# Patient Record
Sex: MALE | Race: WHITE
[De-identification: names, ages, dates, MRNs, and addresses within clinical notes are randomized per-mention and may not be internally consistent; named-entity substitution may affect disease eponyms.]

---

## 2017-08-06 ENCOUNTER — HOSPITAL ENCOUNTER (EMERGENCY)
Dept: HOSPITAL 21 - SED | Age: 37
Discharge: HOME | End: 2017-08-06
Payer: COMMERCIAL

## 2017-08-06 ENCOUNTER — HOSPITAL ENCOUNTER (EMERGENCY)
Dept: HOSPITAL 21 - SED | Age: 37
Discharge: HOME | End: 2017-08-06
Payer: MEDICARE

## 2017-08-06 VITALS — HEIGHT: 71 IN | WEIGHT: 208.45 LBS | BODY MASS INDEX: 29.18 KG/M2

## 2017-08-06 VITALS
DIASTOLIC BLOOD PRESSURE: 88 MMHG | SYSTOLIC BLOOD PRESSURE: 151 MMHG | OXYGEN SATURATION: 98 % | RESPIRATION RATE: 22 BRPM | HEART RATE: 88 BPM

## 2017-08-06 VITALS
HEART RATE: 71 BPM | SYSTOLIC BLOOD PRESSURE: 134 MMHG | OXYGEN SATURATION: 96 % | DIASTOLIC BLOOD PRESSURE: 90 MMHG | RESPIRATION RATE: 16 BRPM

## 2017-08-06 VITALS
HEART RATE: 70 BPM | RESPIRATION RATE: 16 BRPM | SYSTOLIC BLOOD PRESSURE: 128 MMHG | OXYGEN SATURATION: 98 % | DIASTOLIC BLOOD PRESSURE: 82 MMHG

## 2017-08-06 DIAGNOSIS — F41.9: Primary | ICD-10-CM

## 2017-08-06 DIAGNOSIS — R11.2: ICD-10-CM

## 2017-08-06 DIAGNOSIS — F10.21: ICD-10-CM

## 2017-08-06 DIAGNOSIS — F14.21: ICD-10-CM

## 2017-08-06 DIAGNOSIS — F20.9: ICD-10-CM

## 2017-08-06 PROCEDURE — 99283 EMERGENCY DEPT VISIT LOW MDM: CPT

## 2017-08-06 NOTE — ED.REPORT
HPI-NVD


Date of Service


Aug 6, 2017


 ED Provider:  Dr. Be Francis MD


A 37 year old male with a previous history of cocaine and EtOH abuse (in 

recovery 5 years) presents to the ED via EMS with nausea and vomiting that 

began this afternoon. Patient was recently released from prison and has been 

living at [facility]. He reports that he has been feeling very anxious lately 

which has exacerbated his nausea. Patient has not been taking his regular 

medications for the past few days which include quetiapine and BuSpar. BM have 

been normal since symptom onset. He denies any recent fever, chills, abdominal 

pain, or chest pain. Patient adds that he has been drinking 2-3 Red Bull energy 

drinks per day for the past few weeks.


Nursing Notes


Stated Complaint:  NAUSEA,VOMITING


Chief Complaint:  General Complaint


Nursing Notes Reviewed:  Yes


Allergies:  


Coded Allergies:  


     No Known Allergies (Unverified  Allergy, Unknown, 8/6/17)


Scheduled


Ondansetron ODT (Ondansetron ODT) 8 Mg Tab.rapdis 8 MG PO QID 


risperiDONE-Expunged Drug, Do Not Renew! (RisperDAL-Expunged Drug, Do Not Renew!

) 2 Mg Tablet 2 MG PO HS 





General


Time Seen by MD:  01:23





Chief Complaint


Nausea, Vomiting


Hx Obtained From:  Patient


Arrived By:  Ambulance


Onset Occurred:  5 - 8 hours ago


Symptom Duration:  Since onset


Location:  : No pain


Associated with:  Denies: Abdominal pain, Fever, Shaking chills


Pertinent Negative:  Pt denies other symptoms


Recent Healthcare:  No recent doctor visit, No recent hospitalization





Past Medical History


Past Medical History





Cocaine abuse - in recovery


EtOH abuse - in recovery





Past Surgical History





None reported.





Smoking History


Unknown if Ever Smoker





Social History


Alcohol Use:  In recovery


Drug Use:  In recovery, Cocaine


Other Social History:  Good social support, Local resident





Ambulatory Status


Independent





Review of Systems


Constitutional:  Denies: Chills, Fever


GI:  Reports: Nausea, Vomiting, 


   Denies: Abdominal pain, Constipation, Diarrhea


Complete sys rev & neg:  except as marked.


Cardiovascular:  Denies: Chest pain


Psychiatric:  Reports: Anxiety





Physical Exam


Initial Vital Signs





 Vital Signs (First)








  Date Time  Temp Pulse Resp B/P Pulse Ox O2 Delivery O2 Flow Rate FiO2


 


8/6/17 00:57 36.0 71 16 134/90 96 Room Air  








Initial VS:  Reviewed, Vital signs abnormal


    Head / Eyes:  Atraumatic, Normocephalic, PERRL


    Neck:  Supple, Non-tender, Full range of motion


    Extremities:  Vascular intact, Neuro intact, No swelling, No tenderness


    Skin:  Warm, Dry, No cyanosis


    Neurologic:  Alert, Oriented, Nonfocal


General/Constitutional:  Awake, Alert, No acute distress


Abdomen:  Atraumatic, Soft, Non-tender


Respiratory / Chest:  Atraumatic, Breath sounds NL, Breath sounds = bilat, No 

respiratory distress


Cardiovascular:  Heart rate NL, Regular rhythm, Heart sounds NL


Abnormal Mood/Affect:  Positive: Flat affect





Re-Eval/Medical Decision


Med Decision/Clinical Course


37-year-old male who seems to be doing well at [facility] following 5-1/2 

years in prison.  He is on multiple psychiatric medications.  Today he had some 

nausea and vomiting and then could not take his meds and had anxiety associated 

with that.  He was given ondansetron and he tolerated by mouth here.  He was 

given his evening doses of medications and had no further vomiting.  He will be 

discharged home to resume his usual routine.  He will talk to his psych 

provider if he has further problems.


Re-Evaluation/Progress #1:  


   Time of Eval:  02:13


   Re-Evaluation/Progress Note:  


Assigned PO challenge. Nausea has improved but is still present.


Re-Evaluation/Progress #2:  


   Time of Eval:  03:02


   Patient Status:  Condition improved


   Re-Evaluation/Progress Note:  


Patient's symptoms have improved. He is requesting discharge at this time.


All questions are addressed. He is informed of his results and diagnosis.


Counseled Regarding:  Diagnosis, Need for follow-up, When/why to return to ED





Discharge & Departure


Impression:  


 Primary Impression:  


 Anxiety


 Additional Impression:  


 Nausea & vomiting


 Vomiting type:  unspecified  Vomiting Intractability:  non-intractable  

Qualified Code:  R11.2 - Nausea with vomiting, unspecified


Disposition:  Home


Discharge Condition


All VS Reviewed:  Yes


Condition:  Improved


Patient Instructions:  Acute Nausea and Vomiting (ED)





Additional Instructions:


Continue your regular medications.  Talk to your provider about your symptoms 

if she have continued problems.  Ondansetron 8 mg ODT, 1 tablet dissolved 

orally 3-4 times daily as needed for nausea and vomiting, #10 prescription 

written.


Referrals:  


Nitza Reich MD (PCP)


Scribe Attestation


Portions of this note were transcribed by Rosendo Key. IDr. Francis 

personally performed the history, physical exam and medical decision-making; I 

reviewed and confirmed the accuracy of the information in the transcribed note.


copies to:   Nitza Reich MD, Be VILLEDA MD Aug 6, 2017 01:24


ROSENDO KEY Aug 6, 2017 01:28

## 2017-08-06 NOTE — ED.REPORT
HPI-General Illness


Date of Service


Aug 6, 2017


 ED Provider:  Dr. Neumann


Pt is a 37 year old male with a history of schizophrenia who presents to the ED 

with concerns for anxiety. He reports that when he trying to go to sleep, the 

anxiety is the most severe. Pt states that he has been taking all of his 

regular medications, and he was just discharged several hours ago .Pt is 

requesting additional Ativan. He denies any suicidal or homicidal ideations, he 

denies visual hallucinations but admits to auditory hallucinations, at his 

baseline.


Nursing Notes


Stated Complaint:  ANXIETY


Chief Complaint:  Psychiatric Complaint


Nursing Notes Reviewed:  Yes


Allergies:  


Coded Allergies:  


     No Known Allergies (Unverified  Allergy, Unknown, 8/6/17)


Scheduled


Ondansetron ODT (Ondansetron ODT) 8 Mg Tab.rapdis 8 MG PO QID 


risperiDONE-Expunged Drug, Do Not Renew! (RisperDAL-Expunged Drug, Do Not Renew!

) 2 Mg Tablet 2 MG PO HS 





Scheduled PRN


hydrOXYzine Hcl (HydrOXYzine Hcl) 25 Mg Tablet 25 MG PO TID PRN PRN For Anxiety 





General


Time Seen by MD:  07:32





Chief Complaint


Other (Anxiety)


Hx Obtained From:  Patient


Arrived By:  Walk-in


Sudden in Onset?:  No


Severity: Current:  No pain currently


Severity: Maximum:  No pain


Similar Sx Previous:  Yes





Past Medical History


Past Medical History





Cocaine abuse - in recovery


EtOH abuse - in recovery





Past Surgical History





None reported.





Smoking History


Unknown if Ever Smoker





Social History


Alcohol Use:  In recovery


Drug Use:  In recovery, Cocaine


Other Social History:  Good social support, Local resident





Ambulatory Status


Independent





Review of Systems


Full Review of Systems


Constitutional:  Denies: Chills, Fever, Malaise, Weakness - generalized


Respiratory:  Denies: Non-productive cough, Shortness of breath, Wheezing


Cardiovascular:  Denies: Chest pain, Syncope


GI:  Denies: Abdominal pain, Diarrhea, Nausea, Vomiting


 Male:  Denies Dysuria, Denies Flank pain, Denies Urinary frequency, Denies 

Urinary urgency


Musculoskeletal:  Denies: Back pain


Psychiatric:  Reports: Anxiety, Hallucinations, auditory, 


   Denies: Hallucinations, visual


Complete sys rev & neg:  except as marked.





Physical Exam


Vital Signs





 Vital Signs








  Date Time  Temp Pulse Resp B/P Pulse Ox O2 Delivery O2 Flow Rate FiO2


 


8/6/17 07:28 36.5 88 22 151/88 98 Room Air  








Initial VS:  Reviewed


General/Constitutional:  Well-developed, Well-nourished


    Head / Eyes:  Atraumatic, Normocephalic, PERRL


    ENT:  Mucous membranes moist, Conjunctiva normal, No scleral icterus


    Neck:  Supple, Non-tender, Full range of motion


    Respiratory:  Breath sounds normal, Clear to auscultation, No respiratory 

distress


    Skin:  Warm, Dry, No cyanosis


Psychiatric:  Affect NL, Mood NL, Not suicidal, Not homicidal, Cognitive 

function NL, Judgment/insight NL, Thought content NL


Abnormal Thinking / Perception:  Positive: Hallucinations, auditory





Re-Eval/Medical Decision


Med Decision/Clinical Course





Not suicidal or homicidal, no command hallucinations, will give a


three-day supply of hydroxyzine and recommend close follow-up with


psychiatry.  Return precautions given


Source of Hx:  Old records





   Time of Eval:  08:38


   Re-Evaluation/Progress Note:  


Pt is rechecked and informed of his diagnosis and informed of the plan to


discharge him at this time. He understands and agrees, all questions are


addressed.


Counseled Regarding:  Diagnosis, Need for follow-up, When/why to return to ED





Discharge & Departure





 Primary Impression:  


 Anxiety


Disposition:  Home


Discharge Condition


All VS Reviewed:  Yes


Condition:  Stable





Additional Instructions:


Call your psychiatrist in the morning for close follow-up and reevaluation of 

your medications.





Use hydroxyzine up to 3 times a day as needed for anxiety.





Return to the ER if you develop feelings of suicide, homicide, major depression

, or any other urgent concerns.


Referrals:  


Nitza Reich MD (PCP)


Scribe Attestation


Portions of this note were transcribed by Berkley Villanueva. I, Dr. Neumann 

personally performed the history, physical exam and medical decision-making; I 

reviewed and confirmed the accuracy of the information in the transcribed note. 

Signed by: Hal Yusuf, 08/06/2017 08:09


copies to:   Nitza Reich MD, Timothy LARRY MEREDITH Aug 6, 2017 07:33


NEHEMIAS VILLANUEVA Aug 6, 2017 07:54

## 2019-07-13 ENCOUNTER — HOSPITAL ENCOUNTER (EMERGENCY)
Dept: HOSPITAL 76 - ED | Age: 39
Discharge: HOME | End: 2019-07-13
Payer: MEDICARE

## 2019-07-13 ENCOUNTER — HOSPITAL ENCOUNTER (OUTPATIENT)
Dept: HOSPITAL 76 - EMS | Age: 39
Discharge: ANOTHER HOSPITAL | End: 2019-07-13
Attending: SURGERY
Payer: MEDICARE

## 2019-07-13 VITALS — DIASTOLIC BLOOD PRESSURE: 68 MMHG | SYSTOLIC BLOOD PRESSURE: 119 MMHG

## 2019-07-13 DIAGNOSIS — R41.82: Primary | ICD-10-CM

## 2019-07-13 DIAGNOSIS — F17.200: ICD-10-CM

## 2019-07-13 DIAGNOSIS — R45.1: ICD-10-CM

## 2019-07-13 DIAGNOSIS — F25.0: Primary | ICD-10-CM

## 2019-07-13 LAB
AMPHET UR QL SCN: NEGATIVE
ANION GAP SERPL CALCULATED.4IONS-SCNC: 13 MMOL/L (ref 6–13)
APAP SERPL-MCNC: < 10 UG/ML (ref 10–30)
BASOPHILS NFR BLD AUTO: 0.1 10^3/UL (ref 0–0.1)
BASOPHILS NFR BLD AUTO: 1 %
BENZODIAZ UR QL SCN: NEGATIVE
BUN SERPL-MCNC: 23 MG/DL (ref 6–20)
CALCIUM UR-MCNC: 9.4 MG/DL (ref 8.5–10.3)
CHLORIDE SERPL-SCNC: 107 MMOL/L (ref 101–111)
CLARITY UR REFRACT.AUTO: CLEAR
CO2 SERPL-SCNC: 22 MMOL/L (ref 21–32)
COCAINE UR-SCNC: NEGATIVE UMOL/L
CREAT SERPLBLD-SCNC: 0.9 MG/DL (ref 0.6–1.2)
EOSINOPHIL # BLD AUTO: 0.1 10^3/UL (ref 0–0.7)
EOSINOPHIL NFR BLD AUTO: 1.4 %
ERYTHROCYTE [DISTWIDTH] IN BLOOD BY AUTOMATED COUNT: 12.6 % (ref 12–15)
GFRSERPLBLD MDRD-ARVRAT: 94 ML/MIN/{1.73_M2} (ref 89–?)
GLUCOSE SERPL-MCNC: 89 MG/DL (ref 70–100)
GLUCOSE UR QL STRIP.AUTO: NEGATIVE MG/DL
HGB UR QL STRIP: 13.9 G/DL (ref 14–18)
KETONES UR QL STRIP.AUTO: NEGATIVE MG/DL
LYMPHOCYTES # SPEC AUTO: 1.6 10^3/UL (ref 1.5–3.5)
LYMPHOCYTES NFR BLD AUTO: 22 %
MCH RBC QN AUTO: 30.8 PG (ref 27–31)
MCHC RBC AUTO-ENTMCNC: 33.2 G/DL (ref 32–36)
MCV RBC AUTO: 92.7 FL (ref 80–94)
METHADONE UR QL SCN: NEGATIVE
METHAMPHET UR QL SCN: NEGATIVE
MONOCYTES # BLD AUTO: 0.6 10^3/UL (ref 0–1)
MONOCYTES NFR BLD AUTO: 8.8 %
NEUTROPHILS # BLD AUTO: 4.8 10^3/UL (ref 1.5–6.6)
NEUTROPHILS # SNV AUTO: 7.2 X10^3/UL (ref 4.8–10.8)
NEUTROPHILS NFR BLD AUTO: 66.5 %
NITRITE UR QL STRIP.AUTO: NEGATIVE
OPIATES UR QL SCN: NEGATIVE
PDW BLD AUTO: 10.6 FL (ref 7.4–11.4)
PH UR STRIP.AUTO: 6.5 PH (ref 5–7.5)
PLATELET # BLD: 182 10^3/UL (ref 130–450)
PROT UR STRIP.AUTO-MCNC: NEGATIVE MG/DL
RBC # UR STRIP.AUTO: (no result) /UL
RBC MAR: 4.52 10^6/UL (ref 4.7–6.1)
SALICYLATES SERPL-MCNC: < 6 MG/DL
SODIUM SERPLBLD-SCNC: 142 MMOL/L (ref 135–145)
SP GR UR STRIP.AUTO: 1.01 (ref 1–1.03)
UROBILINOGEN UR QL STRIP.AUTO: 2 E.U./DL
UROBILINOGEN UR STRIP.AUTO-MCNC: NEGATIVE MG/DL
VOLATILE DRUGS POS SERPL SCN: (no result)

## 2019-07-13 PROCEDURE — 87086 URINE CULTURE/COLONY COUNT: CPT

## 2019-07-13 PROCEDURE — 80048 BASIC METABOLIC PNL TOTAL CA: CPT

## 2019-07-13 PROCEDURE — 99283 EMERGENCY DEPT VISIT LOW MDM: CPT

## 2019-07-13 PROCEDURE — 36415 COLL VENOUS BLD VENIPUNCTURE: CPT

## 2019-07-13 PROCEDURE — 80307 DRUG TEST PRSMV CHEM ANLYZR: CPT

## 2019-07-13 PROCEDURE — 80320 DRUG SCREEN QUANTALCOHOLS: CPT

## 2019-07-13 PROCEDURE — 84443 ASSAY THYROID STIM HORMONE: CPT

## 2019-07-13 PROCEDURE — 81001 URINALYSIS AUTO W/SCOPE: CPT

## 2019-07-13 PROCEDURE — 85025 COMPLETE CBC W/AUTO DIFF WBC: CPT

## 2019-07-13 PROCEDURE — 80329 ANALGESICS NON-OPIOID 1 OR 2: CPT

## 2019-07-13 PROCEDURE — 96372 THER/PROPH/DIAG INJ SC/IM: CPT

## 2019-07-13 PROCEDURE — 80306 DRUG TEST PRSMV INSTRMNT: CPT

## 2019-07-13 PROCEDURE — 81003 URINALYSIS AUTO W/O SCOPE: CPT

## 2019-07-13 NOTE — ED PHYSICIAN DOCUMENTATION
<Bobbi Grier Reyna - Last Filed: 07/13/19 07:23>





PD HPI MHE





- Stated complaint


Stated Complaint: MHE





- Chief complaint


Chief Complaint: MHE





- History obtained from


History obtained from: Patient





- History of Present Illness


Primary symptom: Psychosis


Timing - onset: How many weeks ago (1)


Similar symptoms before: Diagnosis (Reportedly has a history of schizophrenia 

per EMS)





- Additional information


Additional information: 





This is a 39-year-old man who presents by EMS worried about "an attack" that is 

been perpetuated on him.  He thinks his food might of been poisoned and it is a 

satanic attack by Lucifer.  Manifesting itself is abdominal pain and he is been 

having symptoms for about a week but they are getting progressively worse and 

"something perverted" entered his mouth.  He called 911 earlier in the evening 

but he refused transport because they told him that they were to take him for 

mental health but he is insistent that his symptoms are not related to any 

mental health issue.  He went to see a psychic who told him that a creature had 

entered into his stomach but it was not [place].  He had been incarcerated for 66

months and was released about 2 years ago but he complied with the "DOC" in 

terms of their treatment plan taking medications so that he could be eligible 

for housing.  He says he left the housing about a month ago and has been living 

in hotels ever since then and his SSI disability is due to run out.  He says he 

is quit drinking.  He denies any abdominal surgeries.  He is never had anything 

like this happen before.  Patient is not forthcoming to answer any specific 

questions





Review of Systems


Unable to obtain: Other (Patient is psychotic and clearly hallucinating)


GI: reports: Abdominal Pain.  denies: Vomiting





PD PAST MEDICAL HISTORY





- Past Medical History


Other Past Medical History: Unknown medical HX- unable





- Present Medications


Home Medications: 


                                Ambulatory Orders











 Medication  Instructions  Recorded  Confirmed


 


No Known Home Medications  07/13/19 07/13/19














- Allergies


Allergies/Adverse Reactions: 


                                    Allergies











Allergy/AdvReac Type Severity Reaction Status Date / Time


 


No Known Drug Allergies Allergy   Verified 07/13/19 02:58














- Social History


Does the pt smoke?: Yes


Smoking Status: Current every day smoker


Does the pt drink ETOH?: Yes


ETOH Use: Beer





- POLST


Patient has POLST: No





PD ED PE NORMAL





- Vitals


Vital signs reviewed: Yes





- General


General: Other (He is a thin 39-year-old man who speaks rapidly and is clearly 

hallucinating.  He occasionally looks over each of his shoulders and mumbles 

something to some nonvisible entity.  He insists that he is not hearing voices 

but that they are "spirits".)





- HEENT


HEENT: Atraumatic





- Neck


Neck: Supple, no meningeal sign, No adenopathy, Thyroid normal





- Cardiac


Cardiac: RRR, No murmur





- Respiratory


Respiratory: No respiratory distress, Clear bilaterally





- Abdomen


Abdomen: Normal bowel sounds, Soft, Non tender





- Derm


Derm: Normal color, No rash, Other (No injury noted to the upper extremities)





- Extremities


Extremities: No edema





- Neuro


Neuro: Other (There are no gross neurological deficits.)





- Psych


Psych: Other (He is agitated, frequently shouting out but not aggressive or 

confrontational.  Clearly hallucinating.  He believes that people do not like 

him because he is in the Bible which he is quick to point out to me is the best 

selling book in history.)





PD MEDICAL DECISION MAKING





- ED course


Complexity details: re-evaluated patient, d/w patient


ED course: 





Patient did agree to have his blood drawn and mental health screening labs were 

sent as well as a urine specimen for urine drug screen.  These labs are all 

normal and the drug screen is negative negative alcohol level.  The patient 

continued to be agitated repeatedly saying that he did not want to die in this 

hospital and he needed to go to [place] where they could help him.  He 

believes that he is going to die here and that if we give him any medication is 

going to kill him.  He repeatedly tells me that he came here voluntarily and 

therefore cannot be an involuntary committal.  He declined Zyprexa orally to 

help with his agitation and the longer he was here he just was progressively 

more disruptive but never confrontational.  [facility] due to his paranoid delusions and psychosis.  I 

do not think he is capable of caring for himself in the community at this point 

and has no one that he can call or family to take him to a safe place.  He was 

given Zyprexa IM Which he took voluntarily but reluctantly stating that he was 

not can wake up from it and that we were doing nothing to treat his medical 

condition





0722:  Patient still sleeping after the Zyprexa.  I have asked for [facility] 

evaluation for placement.  Care turned over to Dr. Zaragoza pending their 

evaluation and placement.





Departure





- Departure


Disposition: 01 Home, Self Care


Clinical Impression: 


Schizoaffective disorder


Qualifiers:


 Schizoaffective disorder type: bipolar Qualified Code(s): F25.0 - Schizoaffect

slim disorder, bipolar type





Condition: Stable


Instructions:  ED Schizo Affective Disorder


Follow-Up: 


Your, doctor [Other]


Comments: 


Take your medications as previously prescribed and follow-up with your regular 

doctor.





<Damaso Zaragoza - Last Filed: 07/13/19 10:40>





Results





- Vitals


Vitals: 





                               Vital Signs - 24 hr











  07/13/19 07/13/19 07/13/19





  02:48 05:09 05:20


 


Temperature 36.6 C  


 


Heart Rate 71 67 85


 


Respiratory 18 18 14





Rate   


 


Blood Pressure 132/76 H 136/98 H 125/82 H


 


O2 Saturation 99 98 98














  07/13/19 07/13/19





  05:43 06:52


 


Temperature  


 


Heart Rate 83 63


 


Respiratory 14 18





Rate  


 


Blood Pressure 146/80 H 109/88 H


 


O2 Saturation 97 98








                                     Oxygen











O2 Source                      Room air

















- Labs


Labs: 





                                Laboratory Tests











  07/13/19 07/13/19 07/13/19





  03:00 03:10 03:20


 


WBC   


 


RBC   


 


Hgb   


 


Hct   


 


MCV   


 


MCH   


 


MCHC   


 


RDW   


 


Plt Count   


 


MPV   


 


Neut # (Auto)   


 


Lymph # (Auto)   


 


Mono # (Auto)   


 


Eos # (Auto)   


 


Baso # (Auto)   


 


Absolute Nucleated RBC   


 


Nucleated RBC %   


 


Sodium    142


 


Potassium    4.3


 


Chloride    107


 


Carbon Dioxide    22


 


Anion Gap    13.0


 


BUN    23 H


 


Creatinine    0.9


 


Estimated GFR (MDRD)    94


 


Glucose    89


 


Calcium    9.4


 


TSH   


 


Urine Color   YELLOW 


 


Urine Clarity   CLEAR 


 


Urine pH   6.5 


 


Ur Specific Gravity   1.015 


 


Urine Protein   NEGATIVE 


 


Urine Glucose (UA)   NEGATIVE 


 


Urine Ketones   NEGATIVE 


 


Urine Occult Blood   TRACE-INTA 


 


Urine Nitrite   NEGATIVE 


 


Urine Bilirubin   NEGATIVE 


 


Urine Urobilinogen   2 H 


 


Ur Leukocyte Esterase   NEGATIVE 


 


Ur Microscopic Review   NOT INDICATED 


 


Urine Culture Comments   NOT INDICATED 


 


Salicylates    < 6.0


 


Urine Opiates Screen  NEGATIVE  


 


Ur Oxycodone Screen  NEGATIVE  


 


Urine Methadone Screen  NEGATIVE  


 


Ur Propoxyphene Screen  NEGATIVE  


 


Acetaminophen    < 10 L


 


Ur Barbiturates Screen  NEGATIVE  


 


Ur Tricyclics Screen  NEGATIVE  


 


Ur Phencyclidine Scrn  NEGATIVE  


 


Ur Amphetamine Screen  NEGATIVE  


 


U Methamphetamines Scrn  NEGATIVE  


 


U Benzodiazepines Scrn  NEGATIVE  


 


Urine Cocaine Screen  NEGATIVE  


 


U Cannabinoids Screen  NEGATIVE  


 


Ethyl Alcohol    < 5.0














  07/13/19 07/13/19





  03:20 03:25


 


WBC  7.2 


 


RBC  4.52 L 


 


Hgb  13.9 L 


 


Hct  41.9 L 


 


MCV  92.7 


 


MCH  30.8 


 


MCHC  33.2 


 


RDW  12.6 


 


Plt Count  182 


 


MPV  10.6 


 


Neut # (Auto)  4.8 


 


Lymph # (Auto)  1.6 


 


Mono # (Auto)  0.6 


 


Eos # (Auto)  0.1 


 


Baso # (Auto)  0.1 


 


Absolute Nucleated RBC  0.00 


 


Nucleated RBC %  0.0 


 


Sodium  


 


Potassium  


 


Chloride  


 


Carbon Dioxide  


 


Anion Gap  


 


BUN  


 


Creatinine  


 


Estimated GFR (MDRD)  


 


Glucose  


 


Calcium  


 


TSH   2.92


 


Urine Color  


 


Urine Clarity  


 


Urine pH  


 


Ur Specific Gravity  


 


Urine Protein  


 


Urine Glucose (UA)  


 


Urine Ketones  


 


Urine Occult Blood  


 


Urine Nitrite  


 


Urine Bilirubin  


 


Urine Urobilinogen  


 


Ur Leukocyte Esterase  


 


Ur Microscopic Review  


 


Urine Culture Comments  


 


Salicylates  


 


Urine Opiates Screen  


 


Ur Oxycodone Screen  


 


Urine Methadone Screen  


 


Ur Propoxyphene Screen  


 


Acetaminophen  


 


Ur Barbiturates Screen  


 


Ur Tricyclics Screen  


 


Ur Phencyclidine Scrn  


 


Ur Amphetamine Screen  


 


U Methamphetamines Scrn  


 


U Benzodiazepines Scrn  


 


Urine Cocaine Screen  


 


U Cannabinoids Screen  


 


Ethyl Alcohol  














PD MEDICAL DECISION MAKING





- ED course


ED course: 


After shift change the patient is improved and the DCR has come to the emergency

 department and evaluated the patient.  He has found that the patient has a high

 rate of recidivism at [facility] and typical symptoms for his presentations there. 

 He has spent some time in [facility] for salt and he is now done with his parole.  

He is indicated he does not want to take his medications as he enjoys his manic 

feeling.  He is manipulative in his behavior and arrangements are made for 

follow-up with his prescriber.  The patient does have a history of 

noncompliance.